# Patient Record
(demographics unavailable — no encounter records)

---

## 2024-10-28 NOTE — ASSESSMENT
[FreeTextEntry1] :  Annual Physical Exam - BP is stable. Continue current management. - Check A1c, lipid panels, vitamin levels, urine analysis, TSH, QuantiFERON Plus TB, Hepatitis B, MMRV - Renew Phentermine HCI 30 MG    - Flu vaccine, administered on this visit.  - Discussed lifestyle modification, healthy diet, and weight management. - RTO annually or as needed.   Pt verbalized understanding and will reach should any questions/concerns occur.

## 2024-10-28 NOTE — ADDENDUM
[FreeTextEntry1] : I, Santosh Cai, acted as a scribe on behalf of Dr. Jevon Barker MD, on 10/28/2024.   All medical entries made by the scribe were at my, Dr. Jevon Barker MD, direction and personally dictated by me on 10/28/2024. I have reviewed the chart and agree that the record accurately reflects my personal performance of the history, physical exam, assessment and plan. I have also personally directed, reviewed, and agreed with the chart.

## 2024-10-28 NOTE — HISTORY OF PRESENT ILLNESS
[FreeTextEntry1] : Patient is present today to establish care and for a comprehensive Annual Physical Exam.  [de-identified] : Pt is a 49 yr old female who is present today to establish care and for a comprehensive Annual Physical Exam.  Patient denies keeping up with weight medication, confirms not feeling good when not taking it. Confirms sleeping well. Denies seeing nutritionist, confirms watching diet (plant based), yoga, walking, doing weight. Pt requests going back on medications (Phentermine). Pt reports Dx of fibromyalgia in March, reports using Duloxetine, cholesterol medications (Atorvastatin). Reports fibromyalgia symptoms are present but manageable. Denies having done mammogram, will schedule. Denies having colonoscopy, requests GI referral. Denies FHx of colon cancer. Denies anxiety and depression.   Flu vaccine, oral consent obtained, will administer on this visit. Breast Exam, done with GYN. Denies any CP, chest tightness or SOB. Denies any abdominal pain, urinary symptom, or change in bowel habits. Denies any fever, chills, or night sweats.

## 2024-10-28 NOTE — HEALTH RISK ASSESSMENT
[Good] : ~his/her~  mood as  good [Yes] : Yes [Never] : Never [NO] : No [No] : In the past 12 months have you used drugs other than those required for medical reasons? No [0] : 2) Feeling down, depressed, or hopeless: Not at all (0) [PHQ-2 Negative - No further assessment needed] : PHQ-2 Negative - No further assessment needed [FreeTextEntry1] : health maintenance  [de-identified] : occasionally  [de-identified] : walking, yoga, weights [[id number]] : 0 [MammogramComments] : reports she will schedule [PapSmearComments] : n/a [BoneDensityComments] : n/a [ColonoscopyComments] : reports she will schedule

## 2025-01-08 NOTE — HISTORY OF PRESENT ILLNESS
[Home] : at home, [unfilled] , at the time of the visit. [Medical Office: ([facility])___] : at the medical office located in  [Verbal consent obtained from patient] : the patient, [unfilled] [FreeTextEntry1] : Telephonic  visit done to discuss weight loss options [de-identified] : Telephonic visit done to discuss weight loss options has attempted phentermine has not had success has been taken for approximately 2 months.  Had success previously.  Has been attempting to watch diet denies any side effects on medication.

## 2025-01-08 NOTE — ASSESSMENT
[FreeTextEntry1] : Given the failure of phentermine discussed oral medications versus injections, will attempt a GLP-1 agonist.  Discussed potential GI side effects denies any family history of thyroid cancer.